# Patient Record
Sex: FEMALE | Race: BLACK OR AFRICAN AMERICAN | NOT HISPANIC OR LATINO | URBAN - METROPOLITAN AREA
[De-identification: names, ages, dates, MRNs, and addresses within clinical notes are randomized per-mention and may not be internally consistent; named-entity substitution may affect disease eponyms.]

---

## 2020-10-26 DIAGNOSIS — M25.561 RIGHT KNEE PAIN, UNSPECIFIED CHRONICITY: Primary | ICD-10-CM

## 2023-02-28 ENCOUNTER — OFFICE VISIT (OUTPATIENT)
Dept: OBSTETRICS AND GYNECOLOGY | Facility: CLINIC | Age: 33
End: 2023-02-28
Payer: OTHER GOVERNMENT

## 2023-02-28 VITALS
WEIGHT: 182.56 LBS | DIASTOLIC BLOOD PRESSURE: 80 MMHG | SYSTOLIC BLOOD PRESSURE: 130 MMHG | BODY MASS INDEX: 33.6 KG/M2 | HEIGHT: 62 IN

## 2023-02-28 DIAGNOSIS — Z01.419 WELL WOMAN EXAM: Primary | ICD-10-CM

## 2023-02-28 PROCEDURE — 99999 PR PBB SHADOW E&M-EST. PATIENT-LVL III: CPT | Mod: PBBFAC,,, | Performed by: NURSE PRACTITIONER

## 2023-02-28 PROCEDURE — 99999 PR PBB SHADOW E&M-EST. PATIENT-LVL III: ICD-10-PCS | Mod: PBBFAC,,, | Performed by: NURSE PRACTITIONER

## 2023-02-28 PROCEDURE — 99499 NO LOS: ICD-10-PCS | Mod: S$PBB,,, | Performed by: NURSE PRACTITIONER

## 2023-02-28 PROCEDURE — 99499 UNLISTED E&M SERVICE: CPT | Mod: S$PBB,,, | Performed by: NURSE PRACTITIONER

## 2023-02-28 PROCEDURE — 99213 OFFICE O/P EST LOW 20 MIN: CPT | Mod: PBBFAC,PO | Performed by: NURSE PRACTITIONER

## 2023-02-28 RX ORDER — PROCHLORPERAZINE MALEATE 5 MG
1 TABLET ORAL EVERY 6 HOURS PRN
COMMUNITY
Start: 2022-08-04

## 2023-02-28 RX ORDER — PROMETHAZINE HYDROCHLORIDE 25 MG/1
25 TABLET ORAL
COMMUNITY
Start: 2022-04-27

## 2023-02-28 RX ORDER — NORTRIPTYLINE HYDROCHLORIDE 50 MG/1
50 CAPSULE ORAL
COMMUNITY
Start: 2023-01-19

## 2023-02-28 RX ORDER — PRAMOXINE HYDROCHLORIDE 10 MG/ML
LOTION TOPICAL
COMMUNITY
Start: 2022-12-09

## 2023-02-28 RX ORDER — SODIUM FLUORIDE 5 MG/G
GEL, DENTIFRICE DENTAL
COMMUNITY
Start: 2022-10-03

## 2023-02-28 RX ORDER — DOXYCYCLINE 100 MG/1
100 CAPSULE ORAL
COMMUNITY
Start: 2022-12-09

## 2023-02-28 RX ORDER — CETIRIZINE HYDROCHLORIDE 10 MG/1
10 TABLET ORAL
COMMUNITY
Start: 2022-12-09

## 2023-02-28 RX ORDER — CYCLOBENZAPRINE HCL 5 MG
5 TABLET ORAL
COMMUNITY
Start: 2023-02-02

## 2023-02-28 RX ORDER — TRAMADOL HYDROCHLORIDE 50 MG/1
50 TABLET ORAL
COMMUNITY
Start: 2022-12-19

## 2023-02-28 RX ORDER — FOLIC ACID 1 MG/1
1 TABLET ORAL
COMMUNITY
Start: 2023-01-26

## 2023-02-28 RX ORDER — TRIAMCINOLONE ACETONIDE 1 MG/G
CREAM TOPICAL
COMMUNITY
Start: 2022-12-09

## 2023-02-28 RX ORDER — BENZOYL PEROXIDE 100 MG/ML
LIQUID TOPICAL
COMMUNITY
Start: 2022-12-09

## 2023-02-28 RX ORDER — TRETINOIN 0.25 MG/G
GEL TOPICAL
COMMUNITY
Start: 2022-12-09

## 2023-02-28 RX ORDER — CLINDAMYCIN PHOSPHATE 11.9 MG/ML
SOLUTION TOPICAL
COMMUNITY
Start: 2022-12-09

## 2023-02-28 NOTE — PROGRESS NOTES
Pt and partner states they are unsure why they are here today. Interested in conceiving via sperm donor, has ovulation kareen, cycles are monthly no problems, PAP 7/2022 normal. Pt request visit is canceled.

## 2024-11-04 DIAGNOSIS — M25.561 RIGHT KNEE PAIN, UNSPECIFIED CHRONICITY: Primary | ICD-10-CM

## 2024-11-05 ENCOUNTER — OFFICE VISIT (OUTPATIENT)
Dept: ORTHOPEDICS | Facility: CLINIC | Age: 34
End: 2024-11-05
Payer: OTHER GOVERNMENT

## 2024-11-05 VITALS
HEART RATE: 94 BPM | SYSTOLIC BLOOD PRESSURE: 120 MMHG | WEIGHT: 187.94 LBS | DIASTOLIC BLOOD PRESSURE: 89 MMHG | HEIGHT: 62 IN | BODY MASS INDEX: 34.59 KG/M2

## 2024-11-05 DIAGNOSIS — M23.91 INTERNAL DERANGEMENT OF MULTIPLE SITES OF RIGHT KNEE: Primary | ICD-10-CM

## 2024-11-05 DIAGNOSIS — Z98.890 H/O ARTHROSCOPY OF RIGHT KNEE: ICD-10-CM

## 2024-11-05 PROCEDURE — 99214 OFFICE O/P EST MOD 30 MIN: CPT | Mod: PBBFAC,PN

## 2024-11-05 PROCEDURE — 99999 PR PBB SHADOW E&M-EST. PATIENT-LVL IV: CPT | Mod: PBBFAC,,,

## 2024-11-05 PROCEDURE — 99204 OFFICE O/P NEW MOD 45 MIN: CPT | Mod: S$PBB,,,

## 2024-11-18 ENCOUNTER — TELEPHONE (OUTPATIENT)
Dept: ORTHOPEDICS | Facility: CLINIC | Age: 34
End: 2024-11-18
Payer: OTHER GOVERNMENT

## 2024-11-18 NOTE — TELEPHONE ENCOUNTER
Spoke with patient. Message given as per Dr Sanchez recommendation. Pt will call if wants another injection.

## 2024-11-18 NOTE — TELEPHONE ENCOUNTER
----- Message from Niurka Tovar PA-C sent at 11/18/2024 11:17 AM CST -----  Regarding: MRI Results  Good afternoon,    Please let the patient know the surgeon reviewed the MRI. Her cartilage and previous surgeries do not show any complications. We would not recommend any further surgeries at this time. Can certainly try another injection however.  ----- Message -----  From: Guy Sanchez MD  Sent: 11/18/2024   9:22 AM CST  To: Niurka Tovar PA-C    Not really. Her TT TG distance is within normal limits so I'm not sure its an instability issue  ----- Message -----  From: Niurka Tovar PA-C  Sent: 11/18/2024   8:54 AM CST  To: Guy Sanchez MD    Any surgical recommendations? S/p R knee arthroscopy x 3 including MPFL reconstruction and lateral release. Minimal improvement with surgery, did PT. Injection only lasts a few hours.... I repeated MRI